# Patient Record
Sex: MALE | Race: WHITE | URBAN - NONMETROPOLITAN AREA
[De-identification: names, ages, dates, MRNs, and addresses within clinical notes are randomized per-mention and may not be internally consistent; named-entity substitution may affect disease eponyms.]

---

## 2017-09-12 ENCOUNTER — HISTORY (OUTPATIENT)
Dept: FAMILY MEDICINE | Facility: OTHER | Age: 67
End: 2017-09-12

## 2017-09-12 ENCOUNTER — HOSPITAL ENCOUNTER (OUTPATIENT)
Dept: RADIOLOGY | Facility: OTHER | Age: 67
End: 2017-09-12
Attending: NURSE PRACTITIONER

## 2017-09-12 ENCOUNTER — OFFICE VISIT - GICH (OUTPATIENT)
Dept: FAMILY MEDICINE | Facility: OTHER | Age: 67
End: 2017-09-12

## 2017-09-12 DIAGNOSIS — S93.402A SPRAIN OF LIGAMENT OF LEFT ANKLE: ICD-10-CM

## 2017-09-12 DIAGNOSIS — M25.572 PAIN IN LEFT ANKLE: ICD-10-CM

## 2017-12-28 NOTE — PROGRESS NOTES
"Patient Information     Patient Name MRN Sex Elias Stuart [id number] Male 1950      Progress Notes by Mary Casiano NP at 2017  6:45 PM     Author:  Mary Casiano NP Service:  (none) Author Type:  PHYS- Nurse Practitioner     Filed:  2017  8:50 PM Encounter Date:  2017 Status:  Signed     :  Mary Casiano NP (PHYS- Nurse Practitioner)            Nursing Notes:   Scarlet Cisse  2017  7:21 PM  Signed  Patient presents to the clinic for left ankle injury that happened around noon today. Patient reports falling off a ladder at his home.  Scarlet BAUTISTA CMA.......2017..7:08 PM    SUBJECTIVE:    Elias Magaña is a 67 y.o. male who presents for Ankle pain    Ankle Injury    The incident occurred 6 to 12 hours ago. The incident occurred at home. The injury mechanism was a fall (Fell off a ladder and twisted LT ankle. ). The pain is present in the left ankle. The quality of the pain is described as aching. The pain is at a severity of 7/10. The pain has been constant since onset. Associated symptoms include an inability to bear weight and a loss of motion. Pertinent negatives include no loss of sensation, muscle weakness, numbness or tingling. He reports no foreign bodies present. The symptoms are aggravated by palpation, weight bearing and movement. He has tried ice and elevation for the symptoms. The treatment provided mild relief.       No current outpatient prescriptions on file prior to visit.     No current facility-administered medications on file prior to visit.        REVIEW OF SYSTEMS:  Review of Systems   Neurological: Negative for tingling and numbness.       OBJECTIVE:  /70  Pulse 92  Temp 99.4  F (37.4  C) (Tympanic)  Ht 1.88 m (6' 2\")    EXAM:   Physical Exam   Constitutional: He is well-developed, well-nourished, and in no distress.   HENT:   Head: Normocephalic and atraumatic.   Eyes: Conjunctivae are normal.   Neck: Neck supple. "   Cardiovascular: Normal rate.    Pulmonary/Chest: Effort normal. No respiratory distress.   Musculoskeletal:        Left knee: Normal.        Left ankle: He exhibits decreased range of motion, swelling and ecchymosis. He exhibits normal pulse. Tenderness. Lateral malleolus tenderness found. Achilles tendon normal.   Diffuse swelling in the ankle, lateral and medial side. Most tender over the lateral ankle.    Neurological: He is alert.   Skin: Skin is warm and dry. No rash noted.   Psychiatric: Mood and affect normal.   Nursing note and vitals reviewed.    Completed Ankle xray.  I personally reviewed the xray. There was no fractures noted upon initial read of xray.  Final read pending by radiology.    ASSESSMENT/PLAN:    ICD-10-CM    1. Acute left ankle pain M25.572 XR ANKLE 3 VIEWS LEFT      ANKLE BRACE   2. Sprain of left ankle, unspecified ligament, initial encounter S93.402A ibuprofen (ADVIL; MOTRIN) 600 mg tablet      ANKLE BRACE        Plan:  Home cares and OTC gone over. AVS gone over. Ankle brace given and crutches given. NSAIDs gone over, with Prilosec use. We discussed the side effects of NSAID including stomach pain, stomach upset, or notice black, tarry stools. If this happens the patient has been instructed to contact a healthcare provider as soon as possible. As it may be necessary to adjust the dose of the medicines. F/U in 10-14 days if not slowly improving. Will call if rad changes read.       JOCELYNN DUNBAR NP ....................  9/12/2017   8:50 PM

## 2017-12-28 NOTE — PATIENT INSTRUCTIONS
Patient Information     Patient Name MRN Sex Elias Stuart [id number] Male 1950      Patient Instructions by Mary Casiano NP at 2017  6:45 PM     Author:  Mary Casiano NP Service:  (none) Author Type:  PHYS- Nurse Practitioner     Filed:  2017  7:42 PM Encounter Date:  2017 Status:  Signed     :  Mary Casiano NP (PHYS- Nurse Practitioner)            The x-ray today showed no sign of fracture. Radiologist will review the X-ray within 24-48 hours, I will contact you if the radiologist finds anything of significance on the x-ray that I did not see.    Rest the Ankle, avoid any activity which causes pain.    Apply cold packs to the affected area for 15-20 minutes, 4 times a day. A bag of frozen corn or peas often works well as a cold pack. A cold pack is usually the best treatment for the 1st 2 days after an injury. After 48 hours, apply heat or ice, whichever gives relief.    Ankle brace for 7-10 days for comfort. Crutches for comfort.     Elevate the injured area as much as you are able. If you can get this higher than the heart, this will help minimize pain and swelling.    Also, Take ibuprofen (Advil, Motrin) or naproxen (Aleve), or a similar prescription medication. Use regularly for the first 7-10 days. Later, take as needed for pain, swelling or stiffness. Take this type of medication with food to minimize any stomach irritation. Tylenol may also be taken to help ease the pain.    Call or return to clinic as needed if your pain becomes significantly worse, or fails to improve as anticipated despite following the above recommendations.

## 2017-12-30 NOTE — NURSING NOTE
Patient Information     Patient Name MRN Elias Leblanc [id number] Male 1950      Nursing Note by Scarlet Cisse at 2017  6:45 PM     Author:  Scarlet Cisse Service:  (none) Author Type:  (none)     Filed:  2017  7:21 PM Encounter Date:  2017 Status:  Signed     :  Scarlet Cisse            Patient presents to the clinic for left ankle injury that happened around noon today. Patient reports falling off a ladder at his home.  Scarlet BAUTISTA, VIANCA.......2017..7:08 PM

## 2018-01-23 ENCOUNTER — DOCUMENTATION ONLY (OUTPATIENT)
Dept: FAMILY MEDICINE | Facility: OTHER | Age: 68
End: 2018-01-23

## 2018-01-23 RX ORDER — IBUPROFEN 600 MG/1
600 TABLET, FILM COATED ORAL 4 TIMES DAILY PRN
COMMUNITY
Start: 2017-09-12

## 2018-01-25 VITALS
TEMPERATURE: 99.4 F | HEART RATE: 92 BPM | SYSTOLIC BLOOD PRESSURE: 132 MMHG | DIASTOLIC BLOOD PRESSURE: 70 MMHG | HEIGHT: 74 IN